# Patient Record
Sex: MALE | Race: WHITE
[De-identification: names, ages, dates, MRNs, and addresses within clinical notes are randomized per-mention and may not be internally consistent; named-entity substitution may affect disease eponyms.]

---

## 2019-08-03 ENCOUNTER — HOSPITAL ENCOUNTER (INPATIENT)
Dept: HOSPITAL 53 - M MSPAV | Age: 64
LOS: 2 days | Discharge: HOME | DRG: 700 | End: 2019-08-05
Payer: MEDICARE

## 2019-08-03 VITALS — DIASTOLIC BLOOD PRESSURE: 78 MMHG | SYSTOLIC BLOOD PRESSURE: 138 MMHG

## 2019-08-03 VITALS — WEIGHT: 220.02 LBS | BODY MASS INDEX: 32.59 KG/M2 | HEIGHT: 69 IN

## 2019-08-03 DIAGNOSIS — Z85.46: ICD-10-CM

## 2019-08-03 DIAGNOSIS — N30.41: Primary | ICD-10-CM

## 2019-08-03 DIAGNOSIS — I10: ICD-10-CM

## 2019-08-03 DIAGNOSIS — F17.200: ICD-10-CM

## 2019-08-03 DIAGNOSIS — Z85.048: ICD-10-CM

## 2019-08-03 DIAGNOSIS — Z93.3: ICD-10-CM

## 2019-08-03 DIAGNOSIS — E78.00: ICD-10-CM

## 2019-08-03 RX ADMIN — METOPROLOL TARTRATE SCH MG: 50 TABLET, FILM COATED ORAL at 23:51

## 2019-08-03 RX ADMIN — SODIUM CHLORIDE, SODIUM LACTATE, POTASSIUM CHLORIDE, CALCIUM CHLORIDE AND DEXTROSE MONOHYDRATE SCH MLS/HR: 5; 600; 310; 30; 20 INJECTION, SOLUTION INTRAVENOUS at 23:50

## 2019-08-04 VITALS — DIASTOLIC BLOOD PRESSURE: 76 MMHG | SYSTOLIC BLOOD PRESSURE: 128 MMHG

## 2019-08-04 VITALS — DIASTOLIC BLOOD PRESSURE: 79 MMHG | SYSTOLIC BLOOD PRESSURE: 138 MMHG

## 2019-08-04 VITALS — SYSTOLIC BLOOD PRESSURE: 132 MMHG | DIASTOLIC BLOOD PRESSURE: 79 MMHG

## 2019-08-04 LAB
BASOPHILS # BLD AUTO: 0 10^3/UL (ref 0–0.2)
BASOPHILS NFR BLD AUTO: 0.2 % (ref 0–1)
BUN SERPL-MCNC: 12 MG/DL (ref 7–18)
CALCIUM SERPL-MCNC: 8.8 MG/DL (ref 8.8–10.2)
CHLORIDE SERPL-SCNC: 109 MEQ/L (ref 98–107)
CO2 SERPL-SCNC: 27 MEQ/L (ref 21–32)
CREAT SERPL-MCNC: 1.05 MG/DL (ref 0.7–1.3)
EOSINOPHIL # BLD AUTO: 0 10^3/UL (ref 0–0.5)
EOSINOPHIL NFR BLD AUTO: 0.3 % (ref 0–3)
GFR SERPL CREATININE-BSD FRML MDRD: > 60 ML/MIN/{1.73_M2} (ref 49–?)
GLUCOSE SERPL-MCNC: 116 MG/DL (ref 70–100)
HCT VFR BLD AUTO: 39.2 % (ref 42–52)
HGB BLD-MCNC: 13 G/DL (ref 13.5–17.5)
LYMPHOCYTES # BLD AUTO: 2.1 10^3/UL (ref 1.5–4.5)
LYMPHOCYTES NFR BLD AUTO: 21.6 % (ref 24–44)
MCH RBC QN AUTO: 33 PG (ref 27–33)
MCHC RBC AUTO-ENTMCNC: 33.2 G/DL (ref 32–36.5)
MCV RBC AUTO: 99.5 FL (ref 80–96)
MONOCYTES # BLD AUTO: 0.8 10^3/UL (ref 0–0.8)
MONOCYTES NFR BLD AUTO: 8.6 % (ref 0–5)
NEUTROPHILS # BLD AUTO: 6.6 10^3/UL (ref 1.8–7.7)
NEUTROPHILS NFR BLD AUTO: 69 % (ref 36–66)
PLATELET # BLD AUTO: 134 10^3/UL (ref 150–450)
POTASSIUM SERPL-SCNC: 3.9 MEQ/L (ref 3.5–5.1)
RBC # BLD AUTO: 3.94 10^6/UL (ref 4.3–6.1)
SODIUM SERPL-SCNC: 141 MEQ/L (ref 136–145)
WBC # BLD AUTO: 9.6 10^3/UL (ref 4–10)

## 2019-08-04 RX ADMIN — SODIUM CHLORIDE, SODIUM LACTATE, POTASSIUM CHLORIDE, CALCIUM CHLORIDE AND DEXTROSE MONOHYDRATE SCH MLS/HR: 5; 600; 310; 30; 20 INJECTION, SOLUTION INTRAVENOUS at 20:53

## 2019-08-04 RX ADMIN — METOPROLOL TARTRATE SCH MG: 50 TABLET, FILM COATED ORAL at 20:54

## 2019-08-04 RX ADMIN — FENOFIBRATE SCH MG: 145 TABLET ORAL at 08:31

## 2019-08-04 RX ADMIN — SODIUM CHLORIDE, SODIUM LACTATE, POTASSIUM CHLORIDE, CALCIUM CHLORIDE AND DEXTROSE MONOHYDRATE SCH MLS/HR: 5; 600; 310; 30; 20 INJECTION, SOLUTION INTRAVENOUS at 10:14

## 2019-08-04 RX ADMIN — METOPROLOL TARTRATE SCH MG: 50 TABLET, FILM COATED ORAL at 08:31

## 2019-08-04 RX ADMIN — SODIUM CHLORIDE, SODIUM LACTATE, POTASSIUM CHLORIDE, CALCIUM CHLORIDE AND DEXTROSE MONOHYDRATE SCH MLS/HR: 5; 600; 310; 30; 20 INJECTION, SOLUTION INTRAVENOUS at 06:44

## 2019-08-04 RX ADMIN — LEVOTHYROXINE SODIUM SCH MCG: 150 TABLET ORAL at 06:43

## 2019-08-04 RX ADMIN — LISINOPRIL SCH MG: 5 TABLET ORAL at 08:31

## 2019-08-04 NOTE — HPE
DATE OF ADMISSION:  08/03/2019

 

REASON FOR ADMISSION:   Hematuria with clot retention.

 

HISTORY AND PHYSICAL EXAMINATION:

Mr. Mitchel Mello is a 64-year-old gentleman with a prior history of both rectal

cancer and prostate cancer for which he received pelvic radiation who presented

to the emergency room in [place] with gross hematuria and clot urinary

retention.  They had difficulty placing a catheter, but a 10 Malay Baxter was

placed.  The patient had significant clots and he was transferred here for

further evaluation and management.  Other than lab work, there was no

radiological imaging performed.  He reports that this has happened once before

and he was evaluated by his urologist in [place] and everything was

unremarkable.  He presently manages his bladder with a condom catheter and has

not had a Baxter catheter in place in several years.

 

PAST MEDICAL HISTORY:  Significant for:

Hypertension.

Hypercholesterolemia.

Prostate cancer.

Rectal cancer.

Radiation cystitis.

 

PAST SURGICAL HISTORY:  Significant for:

Colostomy in 1996 for colorectal cancer.

Hernia repair.

Neck surgery.

Penile implant.

Prostate biopsies.

 

SOCIAL HISTORY:

He smokes a little less than a pack a day.  He does not drink or use drugs.  He

has not traveled recently outside of the [place].  He has no history of HIV

or hepatitis exposure.

 

PHYSICAL EXAMINATION:

He is a middle aged gentleman, overweight, in no acute distress.

He is afebrile with stable vital signs,

HEENT examination is unremarkable except for poor dentition.

Neck is supple.

Chest and heart are normal.

His abdomen is soft.  He has a colostomy bag in his left lower quadrant, which

has normal appearing stool.  He has a healed midline abdominal incision.  His

bladder is not palpably distended at this time.

Genitourinary () exam reveals a normal circumcised phallus without lesions.

His testes are descended bilaterally without masses, tenderness or hernia.  His

prostate is firm but nontender.  There is no focal nodules.  There are no other

rectal masses appreciated.

His extremities reveal trace edema to the knees bilaterally, but good range of

motion throughout.

His neurological examination reveals no focal deficit.

 

IMPRESSION:

Gross hematuria with clot urinary retention, likely due to radiation cystitis.

 

PLAN:

IV hydration.  Follow his labs.  I will obtain abdominal imaging in the morning

for further evaluation.

## 2019-08-04 NOTE — REP
Clinical:  Hematuria.

 

Technique:  Axial contrast enhanced images from the lung bases to the pubic

symphysis with coronal and sagittal re-formations using 100 ml Isovue 370

intravenous contrast material.

 

Comparison:  None available.

 

Findings:

The kidneys demonstrate symmetric enhancement with mild presumed chronic

bilateral perinephric stranding as well as scattered bilateral simple appearing

renal cysts measuring up to approximately 2.5 cm on the right kidney and 1.9 cm

on the left kidney.  No nephrolithiasis or hydroureteronephrosis.  The bladder

demonstrates irregular intraluminal high-density material which may reflect

hemorrhage / debris although associated mass lesion cannot definitively be

excluded.  There is evidence for prior prostatectomy and a suspected chronic and

no longer used catheter/pump device is suggested in the region of the right

pericolic gutter within the lower abdomen/upper pelvis with catheter extending

via the right flank into the subcutaneous tissues and extending towards the

scrotum.

 

The enteric system demonstrates a colostomy via the left anterior pelvic wall

with evidence for suspected partial rectosigmoid resection.  Irregular loops of

distal small bowel are noted within the pelvis which demonstrate suture material

and surrounding stranding which is nonspecific and may reflect chronic

postsurgical changes versus possible acute infectious/inflammatory process

(images [id number]).  No free air, free fluid or associated drainable

collection/abscess.  There is no evidence for bowel obstruction.

 

Diffuse fatty infiltration to the liver noted without obvious focal hepatic

lesion.  Spleen, pancreas, gallbladder, and bilateral adrenal glands are normal.

No ascites.  No free air.  No obvious adenopathy.  Atherosclerotic changes to the

aorta and vasculature noted without aneurysm or dissection.  Musculoskeletal

structures demonstrate degenerative changes without focal aggressive lesion.

 

Lung bases demonstrate mild/moderate bibasilar atelectasis.

 

Impression:

1.  High-density material within the bladder likely representing hemorrhage /

debris.  Mass lesion cannot be excluded.  Findings may reflect hemorrhagic

cystitis.  Urology consultation and cystoscopy may be warranted.

2.  Kidneys demonstrate scattered simple appearing cysts up to 2.5 cm without

hydroureternephrosis or further significant renal abnormality.

3.  Postsurgical changes involving the colon as described above including

colostomy and findings to suggest rectosigmoid resection.  Correlation is

recommended.  Chronic postsurgical changes versus acute inflammatory process

involving loops of distal small bowel within the pelvis cannot be excluded.

4.  Small to moderate bibasilar atelectasis.

5.  Further chronic changes as described above.

 

 

Electronically Signed by

Harsha March MD 08/04/2019 09:46 A

## 2019-08-05 VITALS — DIASTOLIC BLOOD PRESSURE: 81 MMHG | SYSTOLIC BLOOD PRESSURE: 124 MMHG

## 2019-08-05 VITALS — SYSTOLIC BLOOD PRESSURE: 124 MMHG | DIASTOLIC BLOOD PRESSURE: 81 MMHG

## 2019-08-05 RX ADMIN — SODIUM CHLORIDE, SODIUM LACTATE, POTASSIUM CHLORIDE, CALCIUM CHLORIDE AND DEXTROSE MONOHYDRATE SCH MLS/HR: 5; 600; 310; 30; 20 INJECTION, SOLUTION INTRAVENOUS at 05:47

## 2019-08-05 RX ADMIN — LISINOPRIL SCH MG: 5 TABLET ORAL at 08:36

## 2019-08-05 RX ADMIN — FENOFIBRATE SCH MG: 145 TABLET ORAL at 08:36

## 2019-08-05 RX ADMIN — METOPROLOL TARTRATE SCH MG: 50 TABLET, FILM COATED ORAL at 08:36

## 2019-08-05 RX ADMIN — LEVOTHYROXINE SODIUM SCH MCG: 150 TABLET ORAL at 05:47
